# Patient Record
Sex: MALE
[De-identification: names, ages, dates, MRNs, and addresses within clinical notes are randomized per-mention and may not be internally consistent; named-entity substitution may affect disease eponyms.]

---

## 2024-09-13 PROBLEM — Z00.00 ENCOUNTER FOR PREVENTIVE HEALTH EXAMINATION: Status: ACTIVE | Noted: 2024-09-13

## 2024-09-16 ENCOUNTER — APPOINTMENT (OUTPATIENT)
Dept: OTOLARYNGOLOGY | Facility: CLINIC | Age: 69
End: 2024-09-16

## 2024-09-16 VITALS — WEIGHT: 155 LBS | HEIGHT: 67 IN | BODY MASS INDEX: 24.33 KG/M2

## 2024-09-16 DIAGNOSIS — Q17.2 MICROTIA: ICD-10-CM

## 2024-09-16 DIAGNOSIS — Q16.1 CONGENITAL ABSENCE, ATRESIA AND STRICTURE OF AUDITORY CANAL (EXTERNAL): ICD-10-CM

## 2024-09-16 DIAGNOSIS — H61.21 IMPACTED CERUMEN, RIGHT EAR: ICD-10-CM

## 2024-09-16 PROCEDURE — 92550 TYMPANOMETRY & REFLEX THRESH: CPT | Mod: 52

## 2024-09-16 PROCEDURE — 99204 OFFICE O/P NEW MOD 45 MIN: CPT | Mod: 25

## 2024-09-16 PROCEDURE — G0268 REMOVAL OF IMPACTED WAX MD: CPT

## 2024-09-16 PROCEDURE — 92557 COMPREHENSIVE HEARING TEST: CPT

## 2024-09-16 NOTE — PHYSICAL EXAM
[Normal] : mucosa is normal [Midline] : trachea located in midline position [de-identified] : Left ear microtia

## 2024-09-16 NOTE — ASSESSMENT
[FreeTextEntry1] : Wax found and cleaned. Cleaning well tolerated by patient. Patient felt improvement in cloginess after cleaning.  I reviewed, interpreted, and discussed the Audiogram done today. Left profound hearing loss.   I recommended against another cosmetic repair given the scar tissue and previous infection.

## 2024-09-16 NOTE — HISTORY OF PRESENT ILLNESS
[Prior Ear Surgery] : prior ear surgery [Diabetes] : diabetes [de-identified] : Patient presents today c/o left ear microtia, clogged ear. Was referred by Dr. Bull for reconstruction of ear. Patient had left ear microtia since birth. No complaints of right ear. Denies pain or trouble hearing. Occasional clogging. Here today to check if he has cochlear.  [Ear Fullness] : no ear fullness [Tinnitus] : no tinnitus [Dizziness] : no dizziness [Headache] : no headache [Otalgia] : no otalgia [Otorrhea] : no otorrhea [Recurrent Otitis Media] : no recurrent otitis media [Meningitis] : no meningitis [Glomus Tumor] : no glomus tumor [Otitis Media with Effusion] : no otitis media with effusion [Stroke] : no stroke [Acoustic Neuroma] : no acoustic neuroma [Presbycusis] : no presbycusis [Facial Nerve Paralysis] : no facial nerve paralysis [Congenital Ear Malformation] : no congenital ear malformation [Allergic Rhinitis] : no allergic rhinitis [Major Depression] : no major depression [Meniere Disease] : no Meniere disease [Eustachian Tube Dysfunction] : no eustachian tube dysfunction [Otosclerosis] : no otosclerosis [Cholesteatoma] : no cholesteatoma [Perilymphatic Fistula] : no perilymphatic fistula [Autoimmune Diseases] : no autoimmune diseases [Hypertension] : no hypertension [Hypotension] : no hypotension [de-identified] : cosmetic left ear surgery in the past.

## 2025-01-08 ENCOUNTER — APPOINTMENT (OUTPATIENT)
Dept: OTOLARYNGOLOGY | Facility: CLINIC | Age: 70
End: 2025-01-08